# Patient Record
Sex: FEMALE | Race: BLACK OR AFRICAN AMERICAN | NOT HISPANIC OR LATINO | Employment: PART TIME | RURAL
[De-identification: names, ages, dates, MRNs, and addresses within clinical notes are randomized per-mention and may not be internally consistent; named-entity substitution may affect disease eponyms.]

---

## 2022-03-28 ENCOUNTER — OFFICE VISIT (OUTPATIENT)
Dept: OBSTETRICS AND GYNECOLOGY | Facility: CLINIC | Age: 42
End: 2022-03-28

## 2022-03-28 ENCOUNTER — LAB (OUTPATIENT)
Dept: LAB | Facility: HOSPITAL | Age: 42
End: 2022-03-28

## 2022-03-28 VITALS
SYSTOLIC BLOOD PRESSURE: 126 MMHG | HEIGHT: 63 IN | WEIGHT: 202 LBS | DIASTOLIC BLOOD PRESSURE: 80 MMHG | BODY MASS INDEX: 35.79 KG/M2

## 2022-03-28 DIAGNOSIS — Z12.31 ENCOUNTER FOR SCREENING MAMMOGRAM FOR BREAST CANCER: ICD-10-CM

## 2022-03-28 DIAGNOSIS — N92.0 MENORRHAGIA WITH REGULAR CYCLE: ICD-10-CM

## 2022-03-28 DIAGNOSIS — Z01.411 ENCOUNTER FOR GYNECOLOGICAL EXAMINATION WITH ABNORMAL FINDING: Primary | ICD-10-CM

## 2022-03-28 DIAGNOSIS — N85.2 ENLARGED UTERUS: ICD-10-CM

## 2022-03-28 PROCEDURE — 2014F MENTAL STATUS ASSESS: CPT | Performed by: NURSE PRACTITIONER

## 2022-03-28 PROCEDURE — 99386 PREV VISIT NEW AGE 40-64: CPT | Performed by: NURSE PRACTITIONER

## 2022-03-28 PROCEDURE — 3008F BODY MASS INDEX DOCD: CPT | Performed by: NURSE PRACTITIONER

## 2022-03-28 RX ORDER — CETIRIZINE HYDROCHLORIDE 10 MG/1
10 TABLET ORAL DAILY
COMMUNITY
Start: 2022-03-02

## 2022-03-28 RX ORDER — FLUTICASONE PROPIONATE 50 MCG
2 SPRAY, SUSPENSION (ML) NASAL DAILY PRN
COMMUNITY
Start: 2021-12-28

## 2022-03-28 RX ORDER — OMEPRAZOLE 40 MG/1
CAPSULE, DELAYED RELEASE ORAL
COMMUNITY
Start: 2022-01-28 | End: 2022-04-21

## 2022-03-28 RX ORDER — BUPROPION HYDROCHLORIDE 150 MG/1
150 TABLET ORAL EVERY MORNING
COMMUNITY
Start: 2022-01-28

## 2022-03-28 RX ORDER — LISINOPRIL AND HYDROCHLOROTHIAZIDE 12.5; 1 MG/1; MG/1
1 TABLET ORAL DAILY
COMMUNITY
Start: 2022-01-25

## 2022-04-05 LAB
LAB AP CASE REPORT: NORMAL
PATH INTERP SPEC-IMP: NORMAL

## 2022-04-06 DIAGNOSIS — N92.0 MENORRHAGIA WITH REGULAR CYCLE: ICD-10-CM

## 2022-04-06 DIAGNOSIS — N85.2 ENLARGED UTERUS: ICD-10-CM

## 2022-04-11 NOTE — PROGRESS NOTES
Subjective   Ashley Jones is a 42 y.o. here for annual exam.    LMP- 2/15; regular, no issues   Last pap- ?  Mammo- ?    Gynecologic Exam  The patient's primary symptoms include pelvic pain. The patient's pertinent negatives include no genital itching, genital lesions, genital odor, genital rash, missed menses, vaginal bleeding or vaginal discharge. This is a recurrent problem. The current episode started more than 1 month ago. The problem occurs intermittently. The problem has been waxing and waning. The problem affects both sides. Pertinent negatives include no abdominal pain, constipation, diarrhea or dysuria. She is sexually active. No, her partner does not have an STD. She uses tubal ligation for contraception. Her menstrual history has been regular.       The following portions of the patient's history were reviewed and updated as appropriate: allergies, current medications, past family history, past medical history, past social history, past surgical history and problem list.    Review of Systems   Constitutional: Negative for activity change, appetite change, diaphoresis, fatigue, unexpected weight gain and unexpected weight loss.   Respiratory: Negative for chest tightness and shortness of breath.    Cardiovascular: Negative for chest pain and palpitations.   Gastrointestinal: Negative for abdominal distention, abdominal pain, constipation and diarrhea.   Genitourinary: Positive for pelvic pain. Negative for amenorrhea, breast discharge, breast lump, breast pain, decreased libido, dyspareunia, dysuria, menstrual problem, missed menses, vaginal bleeding, vaginal discharge and vaginal pain.   Musculoskeletal: Negative for myalgias.   Skin: Negative for color change, dry skin and skin lesions.   Neurological: Negative for light-headedness and headache.   Psychiatric/Behavioral: Negative for agitation, dysphoric mood, sleep disturbance, depressed mood and stress. The patient is not nervous/anxious.         Objective   Physical Exam  Vitals and nursing note reviewed.   Constitutional:       General: She is awake. She is not in acute distress.     Appearance: Normal appearance. She is well-developed and well-groomed. She is not ill-appearing, toxic-appearing or diaphoretic.   Neck:      Thyroid: No thyroid mass, thyromegaly or thyroid tenderness.   Cardiovascular:      Rate and Rhythm: Normal rate and regular rhythm.      Heart sounds: Normal heart sounds.   Pulmonary:      Effort: Pulmonary effort is normal.      Breath sounds: Normal breath sounds.   Chest:   Breasts:      Vinicius Score is 5. Breasts are symmetrical.      Right: Normal. No swelling, bleeding, inverted nipple, mass, nipple discharge, skin change, tenderness, axillary adenopathy or supraclavicular adenopathy.      Left: Normal. No swelling, bleeding, inverted nipple, mass, nipple discharge, skin change, tenderness, axillary adenopathy or supraclavicular adenopathy.       Abdominal:      General: Bowel sounds are normal. There is no distension.      Palpations: Abdomen is soft.      Tenderness: There is no abdominal tenderness.   Genitourinary:     General: Normal vulva.      Exam position: Lithotomy position.      Vinicius stage (genital): 5.      Labia:         Right: No rash, tenderness, lesion or injury.         Left: No rash, tenderness, lesion or injury.       Urethra: No prolapse, urethral pain, urethral swelling or urethral lesion.      Vagina: Normal.      Cervix: Normal.      Uterus: Normal. Enlarged and tender.       Adnexa:         Right: Tenderness present. No mass or fullness.          Left: Tenderness present. No mass or fullness.        Comments: Pap obtained.   Lymphadenopathy:      Upper Body:      Right upper body: No supraclavicular, axillary or pectoral adenopathy.      Left upper body: No supraclavicular, axillary or pectoral adenopathy.      Lower Body: No right inguinal adenopathy. No left inguinal adenopathy.   Skin:      General: Skin is warm and dry.   Neurological:      Mental Status: She is alert and oriented to person, place, and time.      Gait: Gait is intact.   Psychiatric:         Attention and Perception: Attention and perception normal.         Mood and Affect: Mood and affect normal.         Speech: Speech normal.         Behavior: Behavior normal. Behavior is cooperative.           Assessment/Plan   Diagnoses and all orders for this visit:    1. Encounter for gynecological examination with abnormal finding (Primary)  -     Liquid-based Pap Smear, Screening    2. Menorrhagia with regular cycle  -     US Non-ob Transvaginal; Future    3. Enlarged uterus  -     US Non-ob Transvaginal; Future    4. Encounter for screening mammogram for breast cancer  -     Mammo Screening Digital Tomosynthesis Bilateral With CAD; Future          U/s with [facility]. Will call with results when available. Reviewed cervical and breast cancer screening recommendations. Needs mammo at [facility].

## 2022-04-13 ENCOUNTER — OFFICE VISIT (OUTPATIENT)
Dept: OBSTETRICS AND GYNECOLOGY | Facility: CLINIC | Age: 42
End: 2022-04-13

## 2022-04-13 DIAGNOSIS — N83.202 LEFT OVARIAN CYST: Primary | ICD-10-CM

## 2022-04-13 DIAGNOSIS — R10.2 PELVIC PAIN: ICD-10-CM

## 2022-04-13 DIAGNOSIS — N94.6 DYSMENORRHEA: ICD-10-CM

## 2022-04-13 DIAGNOSIS — Z98.51 POST-TUBAL LIGATION SYNDROME: ICD-10-CM

## 2022-04-13 DIAGNOSIS — N99.89 POST-TUBAL LIGATION SYNDROME: ICD-10-CM

## 2022-04-13 DIAGNOSIS — N93.9 ABNORMAL UTERINE BLEEDING (AUB): ICD-10-CM

## 2022-04-13 PROCEDURE — 99441 PR PHYS/QHP TELEPHONE EVALUATION 5-10 MIN: CPT | Performed by: OBSTETRICS & GYNECOLOGY

## 2022-04-13 NOTE — PROGRESS NOTES
Ashley Jones is a 42 y.o. y/o female.     Chief Complaint: Ovarian cyst    HPI:   42 y.o. .  No LMP recorded..  Patient was seen via telephone visit.  Lives in [place] and with poor weather today did not feel that she could make it up here.  Discussed with patient symptoms and findings at this time.  Patient had recent pelvic ultrasound which showed a 14 x 14 cm left ovarian mass.  This appeared to be a simple cyst.  Patient has been having lots of pelvic pressure and pain with significantly heavier periods and passing large clots.  Patient is interested in definitive management of the symptoms.  I feel that this ovary needs to be removed to get this cyst removed while this is likely simple cyst this could represent cancer although I feel that this is unlikely.  Will recommend a CA-125 today.  Given that the patient is having heavy bleeding with significant pelvic pain she would like to have a hysterectomy at the same time.  Has tried Mirena in the past but did not tolerate this well.  Also states that symptoms have gotten significantly worse since her tubal approximately 14 years ago.     Review of Systems   Constitutional: Negative for chills, fatigue and fever.   HENT: Negative for sore throat.    Eyes: Negative for visual disturbance.   Respiratory: Negative for cough, shortness of breath and wheezing.    Cardiovascular: Negative for chest pain, palpitations and leg swelling.   Gastrointestinal: Negative for abdominal pain, diarrhea, nausea and vomiting.   Endocrine: Negative for cold intolerance and heat intolerance.   Genitourinary: Positive for menstrual problem, pelvic pain and vaginal bleeding. Negative for dysuria, flank pain, frequency, vaginal discharge and vaginal pain.   Skin: Negative for color change and pallor.   Neurological: Negative for syncope, light-headedness and headaches.   Psychiatric/Behavioral: Negative for dysphoric mood and suicidal ideas. The patient is not  nervous/anxious.         The following portions of the patient's history were reviewed and updated as appropriate: allergies, current medications, past family history, past medical history, past social history, past surgical history and problem list.    Allergies   Allergen Reactions   • Amlodipine Besylate Unknown - Low Severity   • Sulfamethoxazole-Trimethoprim Unknown - Low Severity        Prior to Admission medications    Medication Sig Start Date End Date Taking? Authorizing Provider   buPROPion XL (WELLBUTRIN XL) 150 MG 24 hr tablet  22   Camilo Messina MD   cetirizine (zyrTEC) 10 MG tablet  3/2/22   Camilo Messina MD   fluticasone (FLONASE) 50 MCG/ACT nasal spray  21   Camilo Messina MD   lisinopril-hydrochlorothiazide (PRINZIDE,ZESTORETIC) 10-12.5 MG per tablet  22   Camilo Messina MD   omeprazole (priLOSEC) 40 MG capsule  22   Camilo Messina MD        The patient has a family history of   Family History   Problem Relation Age of Onset   • Hypertension Father    • Diabetes Father    • Hypertension Mother         History reviewed. No pertinent past medical history.     OB History        3    Para   2    Term   1       1    AB   1    Living   2       SAB        IAB        Ectopic        Molar        Multiple        Live Births   2                 Social History     Socioeconomic History   • Marital status: Significant Other   Tobacco Use   • Smoking status: Never Smoker   Substance and Sexual Activity   • Alcohol use: Never   • Drug use: Never        Past Surgical History:   Procedure Laterality Date   •  SECTION     • TUBAL ABDOMINAL LIGATION          There is no problem list on file for this patient.       There were no vitals filed for this visit.     There is no height or weight on file to calculate BMI.      Laboratory Data:   No results for input(s): GLUCOSE, BUN, CREATININE, NA, K, CL, CO2, CALCIUM, PROTEINTOT, ALBUMIN, ALT,  AST, ALKPHOS, BILITOT, EGFRIFNONA, GLOB, AGRATIO, BCR, ANIONGAP, BILIDIR, INDBILI in the last 92685 hours.  No results for input(s): WBC, RBC, HGB, HCT, MCV, MCH, MCHC, RDW, RDWSD, MPV, PLT in the last 28064 hours.  No results for input(s): HCGQUAL in the last 18525 hours.    Last Pap smear:   Last Completed Pap Smear          PAP SMEAR (Every 3 Years) Next due on 3/28/2025    03/28/2022  Liquid-based Pap Smear, Screening            Up-to-date    Assessment/Plan   Diagnoses and all orders for this visit:    1. Left ovarian cyst (Primary)    2. Pelvic pain    3. Abnormal uterine bleeding (AUB)    4. Dysmenorrhea    5. Post-tubal ligation syndrome      Patient with large left ovarian cyst which should be removed surgically.  Also with significant heavy periods and abdominal pain having failed Mirena in the past.  Patient now desiring definitive management of all of the symptoms.  We will plan for MARIANA/BSO sometime at next surgical availability.  Patient to return to see me in 1 week for endometrial biopsy will schedule surgery at that time.    This document has been electronically signed by Cruzito Tong DO on April 13, 2022 11:37 CDT     This visit has been rescheduled as a phone visit to comply with patient safety concerns in accordance with CDC recommendations. Total time of discussion was 10 minutes.

## 2022-04-19 ENCOUNTER — LAB (OUTPATIENT)
Dept: LAB | Facility: HOSPITAL | Age: 42
End: 2022-04-19

## 2022-04-19 DIAGNOSIS — N83.202 LEFT OVARIAN CYST: ICD-10-CM

## 2022-04-19 PROCEDURE — 86304 IMMUNOASSAY TUMOR CA 125: CPT

## 2022-04-20 LAB — CANCER AG125 SERPL QL: 19 U/ML (ref 0–38.1)

## 2022-04-21 ENCOUNTER — OFFICE VISIT (OUTPATIENT)
Dept: OBSTETRICS AND GYNECOLOGY | Facility: CLINIC | Age: 42
End: 2022-04-21

## 2022-04-21 VITALS
HEIGHT: 63 IN | BODY MASS INDEX: 35.26 KG/M2 | DIASTOLIC BLOOD PRESSURE: 94 MMHG | SYSTOLIC BLOOD PRESSURE: 142 MMHG | WEIGHT: 199 LBS

## 2022-04-21 DIAGNOSIS — N99.89 POST-TUBAL LIGATION SYNDROME: ICD-10-CM

## 2022-04-21 DIAGNOSIS — N92.0 MENORRHAGIA WITH REGULAR CYCLE: ICD-10-CM

## 2022-04-21 DIAGNOSIS — N83.201 RIGHT OVARIAN CYST: ICD-10-CM

## 2022-04-21 DIAGNOSIS — N83.202 LEFT OVARIAN CYST: ICD-10-CM

## 2022-04-21 DIAGNOSIS — N93.9 ABNORMAL UTERINE BLEEDING (AUB): Primary | ICD-10-CM

## 2022-04-21 DIAGNOSIS — Z98.51 POST-TUBAL LIGATION SYNDROME: ICD-10-CM

## 2022-04-21 DIAGNOSIS — N94.6 DYSMENORRHEA: ICD-10-CM

## 2022-04-21 DIAGNOSIS — R10.2 PELVIC PAIN: ICD-10-CM

## 2022-04-21 DIAGNOSIS — N85.2 ENLARGED UTERUS: ICD-10-CM

## 2022-04-21 PROCEDURE — 58100 BIOPSY OF UTERUS LINING: CPT | Performed by: OBSTETRICS & GYNECOLOGY

## 2022-04-21 PROCEDURE — 99214 OFFICE O/P EST MOD 30 MIN: CPT | Performed by: OBSTETRICS & GYNECOLOGY

## 2022-04-21 RX ORDER — SODIUM CHLORIDE, SODIUM LACTATE, POTASSIUM CHLORIDE, CALCIUM CHLORIDE 600; 310; 30; 20 MG/100ML; MG/100ML; MG/100ML; MG/100ML
125 INJECTION, SOLUTION INTRAVENOUS CONTINUOUS
Status: CANCELLED | OUTPATIENT
Start: 2022-05-24

## 2022-04-21 RX ORDER — SODIUM CHLORIDE 0.9 % (FLUSH) 0.9 %
3 SYRINGE (ML) INJECTION EVERY 12 HOURS SCHEDULED
Status: CANCELLED | OUTPATIENT
Start: 2022-05-24

## 2022-04-21 RX ORDER — SODIUM CHLORIDE 0.9 % (FLUSH) 0.9 %
10 SYRINGE (ML) INJECTION AS NEEDED
Status: CANCELLED | OUTPATIENT
Start: 2022-05-24

## 2022-04-21 RX ORDER — BUPIVACAINE HCL/0.9 % NACL/PF 0.1 %
2 PLASTIC BAG, INJECTION (ML) EPIDURAL ONCE
Status: CANCELLED | OUTPATIENT
Start: 2022-05-24 | End: 2022-04-21

## 2022-04-21 NOTE — PROGRESS NOTES
Ashley Jones is a 42 y.o. y/o female.     Chief Complaint: Large ovarian cysts, pelvic pain and heavy bleeding    HPI:   42 y.o. .  No LMP recorded..  Patient presents for follow-up on ovarian cysts and pelvic pain with bleeding today.  Patient continues to have pelvic pain and bleeding.  Unclear if these are secondary to cyst.  Patient has a large 14 cm cyst on her right ovary and a smaller 3 cm cyst on her left ovary.  Patient has longstanding pelvic problems, has tried hormonal therapy in the past but did not tolerate well and side effects did not do well for her.  Patient is now wanting definitive management of these symptoms via hysterectomy.  Patient has asked that since she is going to have to undergo major surgery to remove these ovarian cyst she would like uterus removed at the same time to alleviate the pelvic pain and cramping I felt that this was a reasonable request to avoid additional large surgeries in the future.  Patient also here for endometrial biopsy in preparation for hysterectomy.  Risks and benefits of this procedure explained.    Patient requests laparoscopic hysterectomy with bilateral salpingo-oophorectomy and cystoscopy. She understands the risk up to and including death. She understands the risk of serious urologic morbidity such as vesico vaginal fistula, damage to the ureter and or bladder with possible need for additional surgery and low possibility of nephrectomy and/or extended morbidity. She understands the risk of damage to bowel possible colostomy. She understands the risk of damage to bowel undetected at time of procedure with sepsis and/or need returned OR.  I discussed the risk of bleeding with the patient and possible risk of blood transfusion.  Blood products carry risk of HIV, infection, hepatitis, and transfusion reaction. Patient is willing to accept blood products. She understands the risk of delayed hemorrhage with possible need to return to the OR. She  understands the risk of hematoma formation. She understands the risk of infection in the abdominal wall, pelvis, abdomen and other parts of the body. She understands the alternatives of medical therapy and the risks and alternatives. Her questions are answered at length. She understands that there is a  possibility that we may need to convert this to a total abdominal hysterectomy and she accepts this. Patient expressed understanding and desires to proceed with surgery.    Note from last visit: Discussed with patient symptoms and findings at this time.  Patient had recent pelvic ultrasound which showed a 14 x 14 cm left ovarian mass.  This appeared to be a simple cyst.  Patient has been having lots of pelvic pressure and pain with significantly heavier periods and passing large clots.  Patient is interested in definitive management of the symptoms.  I feel that this ovary needs to be removed to get this cyst removed while this is likely simple cyst this could represent cancer although I feel that this is unlikely.  Will recommend a CA-125 today.  Given that the patient is having heavy bleeding with significant pelvic pain she would like to have a hysterectomy at the same time.  Has tried Mirena in the past but did not tolerate this well.  Also states that symptoms have gotten significantly worse since her tubal approximately 14 years ago     Review of Systems   Constitutional: Negative for chills, fatigue and fever.   HENT: Negative for sore throat.    Eyes: Negative for visual disturbance.   Respiratory: Negative for cough, shortness of breath and wheezing.    Cardiovascular: Negative for chest pain, palpitations and leg swelling.   Gastrointestinal: Negative for abdominal pain, diarrhea, nausea and vomiting.   Endocrine: Negative for cold intolerance and heat intolerance.   Genitourinary: Positive for menstrual problem, pelvic pain and vaginal bleeding. Negative for dysuria, flank pain, frequency, vaginal  discharge and vaginal pain.   Skin: Negative for color change and pallor.   Neurological: Negative for syncope, light-headedness and headaches.   Psychiatric/Behavioral: Negative for dysphoric mood and suicidal ideas. The patient is not nervous/anxious.         The following portions of the patient's history were reviewed and updated as appropriate: allergies, current medications, past family history, past medical history, past social history, past surgical history and problem list.    Allergies   Allergen Reactions   • Amlodipine Besylate Unknown - Low Severity   • Sulfamethoxazole-Trimethoprim Unknown - Low Severity        Prior to Admission medications    Medication Sig Start Date End Date Taking? Authorizing Provider   buPROPion XL (WELLBUTRIN XL) 150 MG 24 hr tablet  22  Yes ProviderCamilo MD   cetirizine (zyrTEC) 10 MG tablet  3/2/22  Yes ProviderCamilo MD   fluticasone (FLONASE) 50 MCG/ACT nasal spray  21  Yes ProviderCamilo MD   lisinopril-hydrochlorothiazide (PRINZIDE,ZESTORETIC) 10-12.5 MG per tablet  22  Yes ProviderCamilo MD   omeprazole (priLOSEC) 40 MG capsule  22  Provider, MD Camilo        The patient has a family history of   Family History   Problem Relation Age of Onset   • Hypertension Father    • Diabetes Father    • Hypertension Mother         History reviewed. No pertinent past medical history.     OB History        3    Para   2    Term   1       1    AB   1    Living   2       SAB        IAB        Ectopic        Molar        Multiple        Live Births   2                 Social History     Socioeconomic History   • Marital status: Significant Other   Tobacco Use   • Smoking status: Never Smoker   Substance and Sexual Activity   • Alcohol use: Never   • Drug use: Never        Past Surgical History:   Procedure Laterality Date   •  SECTION     • TUBAL ABDOMINAL LIGATION          Patient Active Problem List  "  Diagnosis   • Abnormal uterine bleeding (AUB)   • Pelvic pain   • Left ovarian cyst   • Dysmenorrhea   • Post-tubal ligation syndrome   • Menorrhagia with regular cycle   • Enlarged uterus   • Right ovarian cyst        Documented Vitals    04/21/22 1349   BP: 142/94   Weight: 90.3 kg (199 lb)   Height: 160 cm (63\")        Body mass index is 35.25 kg/m².    Physical Exam  Vitals and nursing note reviewed.   Constitutional:       General: She is not in acute distress.     Appearance: Normal appearance. She is well-developed. She is not ill-appearing, toxic-appearing or diaphoretic.   HENT:      Head: Normocephalic.      Mouth/Throat:      Mouth: Mucous membranes are moist.   Neck:      Thyroid: No thyromegaly.   Genitourinary:     General: Normal vulva.      Vagina: Normal.      Comments: Speculum exam performed.  Normal-appearing vagina and cervix.  Cervix was cleaned with Betadine.  Endometrial biopsy Pipelle introduced and sounded to approximately 9 cm.  Small amount of tissue obtained.  Normal pelvic exam large masses in both adnexa.  Musculoskeletal:         General: No swelling, tenderness or deformity. Normal range of motion.      Cervical back: Normal range of motion.   Skin:     General: Skin is warm and dry.      Findings: No erythema.   Neurological:      General: No focal deficit present.      Mental Status: She is alert and oriented to person, place, and time.   Psychiatric:         Mood and Affect: Mood normal.         Behavior: Behavior normal.         Thought Content: Thought content normal.         Judgment: Judgment normal.         Assessment/Plan   Diagnoses and all orders for this visit:    1. Abnormal uterine bleeding (AUB) (Primary)  -     Tissue Pathology Exam  -     Case Request; Standing  -     COVID PRE-OP / PRE-PROCEDURE SCREENING ORDER (NO ISOLATION) - Swab, Nasopharynx; Future  -     CBC and Differential; Future  -     Basic Metabolic Panel; Future  -     Case Request    2. Pelvic " pain  -     Case Request; Standing  -     COVID PRE-OP / PRE-PROCEDURE SCREENING ORDER (NO ISOLATION) - Swab, Nasopharynx; Future  -     CBC and Differential; Future  -     Basic Metabolic Panel; Future  -     Case Request    3. Left ovarian cyst  -     Case Request; Standing  -     COVID PRE-OP / PRE-PROCEDURE SCREENING ORDER (NO ISOLATION) - Swab, Nasopharynx; Future  -     CBC and Differential; Future  -     Basic Metabolic Panel; Future  -     Case Request    4. Dysmenorrhea  -     Case Request; Standing  -     COVID PRE-OP / PRE-PROCEDURE SCREENING ORDER (NO ISOLATION) - Swab, Nasopharynx; Future  -     CBC and Differential; Future  -     Basic Metabolic Panel; Future  -     Case Request    5. Post-tubal ligation syndrome  -     Case Request; Standing  -     COVID PRE-OP / PRE-PROCEDURE SCREENING ORDER (NO ISOLATION) - Swab, Nasopharynx; Future  -     CBC and Differential; Future  -     Basic Metabolic Panel; Future  -     Case Request    6. Menorrhagia with regular cycle  -     Case Request; Standing  -     COVID PRE-OP / PRE-PROCEDURE SCREENING ORDER (NO ISOLATION) - Swab, Nasopharynx; Future  -     CBC and Differential; Future  -     Basic Metabolic Panel; Future  -     Case Request    7. Enlarged uterus  -     Case Request; Standing  -     COVID PRE-OP / PRE-PROCEDURE SCREENING ORDER (NO ISOLATION) - Swab, Nasopharynx; Future  -     CBC and Differential; Future  -     Basic Metabolic Panel; Future  -     Case Request    8. Right ovarian cyst  -     Case Request; Standing  -     COVID PRE-OP / PRE-PROCEDURE SCREENING ORDER (NO ISOLATION) - Swab, Nasopharynx; Future  -     CBC and Differential; Future  -     Basic Metabolic Panel; Future  -     Case Request    Other orders  -     Follow Anesthesia Guidelines / Standing Orders; Future  -     Chlorhexidine Skin Prep; Future      Patient with large bilateral adnexal masses likely benign.  Normal .  Patient also with pelvic pain and heavy bleeding.  Patient  is desiring definitive management of all these things with 1 surgery rather than having multiple surgeries if pain does not improve.  We will plan for total laparoscopic hysterectomy with bilateral salpingo-oophorectomy on 24 May 2022    This document has been electronically signed by Cruzito Tong DO on April 21, 2022 16:52 CDT

## 2022-04-26 LAB
LAB AP CASE REPORT: NORMAL
LAB AP CLINICAL INFORMATION: NORMAL
PATH REPORT.FINAL DX SPEC: NORMAL

## 2022-05-23 ENCOUNTER — ANESTHESIA EVENT (OUTPATIENT)
Dept: PERIOP | Facility: HOSPITAL | Age: 42
End: 2022-05-23

## 2022-05-23 ENCOUNTER — PRE-ADMISSION TESTING (OUTPATIENT)
Dept: PREADMISSION TESTING | Facility: HOSPITAL | Age: 42
End: 2022-05-23

## 2022-05-23 ENCOUNTER — LAB (OUTPATIENT)
Dept: LAB | Facility: HOSPITAL | Age: 42
End: 2022-05-23

## 2022-05-23 VITALS
RESPIRATION RATE: 18 BRPM | WEIGHT: 204 LBS | OXYGEN SATURATION: 97 % | SYSTOLIC BLOOD PRESSURE: 118 MMHG | BODY MASS INDEX: 36.14 KG/M2 | HEIGHT: 63 IN | HEART RATE: 75 BPM | DIASTOLIC BLOOD PRESSURE: 80 MMHG

## 2022-05-23 DIAGNOSIS — R10.2 PELVIC PAIN: ICD-10-CM

## 2022-05-23 DIAGNOSIS — N83.201 RIGHT OVARIAN CYST: ICD-10-CM

## 2022-05-23 DIAGNOSIS — N93.9 ABNORMAL UTERINE BLEEDING (AUB): ICD-10-CM

## 2022-05-23 DIAGNOSIS — N92.0 MENORRHAGIA WITH REGULAR CYCLE: ICD-10-CM

## 2022-05-23 DIAGNOSIS — N85.2 ENLARGED UTERUS: ICD-10-CM

## 2022-05-23 DIAGNOSIS — N94.6 DYSMENORRHEA: ICD-10-CM

## 2022-05-23 DIAGNOSIS — N99.89 POST-TUBAL LIGATION SYNDROME: ICD-10-CM

## 2022-05-23 DIAGNOSIS — Z98.51 POST-TUBAL LIGATION SYNDROME: ICD-10-CM

## 2022-05-23 DIAGNOSIS — N83.202 LEFT OVARIAN CYST: ICD-10-CM

## 2022-05-23 LAB
ABO GROUP BLD: NORMAL
ANION GAP SERPL CALCULATED.3IONS-SCNC: 13 MMOL/L (ref 5–15)
BASOPHILS # BLD AUTO: 0.02 10*3/MM3 (ref 0–0.2)
BASOPHILS NFR BLD AUTO: 0.4 % (ref 0–1.5)
BLD GP AB SCN SERPL QL: NEGATIVE
BUN SERPL-MCNC: 8 MG/DL (ref 6–20)
BUN/CREAT SERPL: 9.5 (ref 7–25)
CALCIUM SPEC-SCNC: 9.5 MG/DL (ref 8.6–10.5)
CHLORIDE SERPL-SCNC: 101 MMOL/L (ref 98–107)
CO2 SERPL-SCNC: 25 MMOL/L (ref 22–29)
CREAT SERPL-MCNC: 0.84 MG/DL (ref 0.57–1)
DEPRECATED RDW RBC AUTO: 41.5 FL (ref 37–54)
EGFRCR SERPLBLD CKD-EPI 2021: 88.6 ML/MIN/1.73
EOSINOPHIL # BLD AUTO: 0.03 10*3/MM3 (ref 0–0.4)
EOSINOPHIL NFR BLD AUTO: 0.6 % (ref 0.3–6.2)
ERYTHROCYTE [DISTWIDTH] IN BLOOD BY AUTOMATED COUNT: 13.3 % (ref 12.3–15.4)
GLUCOSE SERPL-MCNC: 84 MG/DL (ref 65–99)
HCT VFR BLD AUTO: 37.1 % (ref 34–46.6)
HGB BLD-MCNC: 12.3 G/DL (ref 12–15.9)
IMM GRANULOCYTES # BLD AUTO: 0.02 10*3/MM3 (ref 0–0.05)
IMM GRANULOCYTES NFR BLD AUTO: 0.4 % (ref 0–0.5)
LYMPHOCYTES # BLD AUTO: 1.94 10*3/MM3 (ref 0.7–3.1)
LYMPHOCYTES NFR BLD AUTO: 39.4 % (ref 19.6–45.3)
Lab: NORMAL
MCH RBC QN AUTO: 28.5 PG (ref 26.6–33)
MCHC RBC AUTO-ENTMCNC: 33.2 G/DL (ref 31.5–35.7)
MCV RBC AUTO: 85.9 FL (ref 79–97)
MONOCYTES # BLD AUTO: 0.35 10*3/MM3 (ref 0.1–0.9)
MONOCYTES NFR BLD AUTO: 7.1 % (ref 5–12)
NEUTROPHILS NFR BLD AUTO: 2.57 10*3/MM3 (ref 1.7–7)
NEUTROPHILS NFR BLD AUTO: 52.1 % (ref 42.7–76)
NRBC BLD AUTO-RTO: 0 /100 WBC (ref 0–0.2)
PLATELET # BLD AUTO: 407 10*3/MM3 (ref 140–450)
PMV BLD AUTO: 9.7 FL (ref 6–12)
POTASSIUM SERPL-SCNC: 3.4 MMOL/L (ref 3.5–5.2)
RBC # BLD AUTO: 4.32 10*6/MM3 (ref 3.77–5.28)
RH BLD: POSITIVE
SARS-COV-2 N GENE RESP QL NAA+PROBE: NOT DETECTED
SODIUM SERPL-SCNC: 139 MMOL/L (ref 136–145)
T&S EXPIRATION DATE: NORMAL
WBC NRBC COR # BLD: 4.93 10*3/MM3 (ref 3.4–10.8)

## 2022-05-23 PROCEDURE — 93010 ELECTROCARDIOGRAM REPORT: CPT | Performed by: INTERNAL MEDICINE

## 2022-05-23 PROCEDURE — 93005 ELECTROCARDIOGRAM TRACING: CPT

## 2022-05-23 PROCEDURE — 36415 COLL VENOUS BLD VENIPUNCTURE: CPT

## 2022-05-23 PROCEDURE — 85025 COMPLETE CBC W/AUTO DIFF WBC: CPT

## 2022-05-23 PROCEDURE — 86850 RBC ANTIBODY SCREEN: CPT

## 2022-05-23 PROCEDURE — 80048 BASIC METABOLIC PNL TOTAL CA: CPT

## 2022-05-23 PROCEDURE — 86901 BLOOD TYPING SEROLOGIC RH(D): CPT

## 2022-05-23 PROCEDURE — 86900 BLOOD TYPING SEROLOGIC ABO: CPT

## 2022-05-23 PROCEDURE — C9803 HOPD COVID-19 SPEC COLLECT: HCPCS

## 2022-05-23 PROCEDURE — 87635 SARS-COV-2 COVID-19 AMP PRB: CPT

## 2022-05-23 RX ORDER — OMEPRAZOLE 40 MG/1
40 CAPSULE, DELAYED RELEASE ORAL DAILY
COMMUNITY

## 2022-05-23 NOTE — PAT
Chlorhexidine scrub given with instruction sheet. Instructions reviewed in PAT, understanding verbalized.    Dr. Raphael here to review EKG and speak with patient. No orders received, ok to proceed.

## 2022-05-23 NOTE — DISCHARGE INSTRUCTIONS
[facility]  Pre-op Information and Guidelines    You will be called after 2 p.m. the day before your surgery (Friday for Monday surgery) and notified of your time for arrival and approximate surgery time.  If you have not received a call by 4P.M., please contact Same Day Surgery at [phone] of if outside [facility] call [phone].    Please Follow these Important Safety Guidelines:    The morning of your procedure, take only the medications listed below with   A sip of water:_____________________________________________       ______________________________________________    DO NOT eat or drink anything after 12:00 midnight the night before surgery  Specific instructions concerning drinking clear liquids will be discussed during  the pre-surgery instruction call the day before your surgery.    If you take a blood thinner (ex. Plavix, Coumadin, aspirin), ask your doctor when to stop it before surgery  STOP DATE: _________________    Only 2 visitors are allowed in patient rooms at a time  Your visitors will be asked to wait in the lobby until the admission process is complete with the exception of a parent with a child and patients in need of special assistance.    YOU CANNOT DRIVE YOURSELF HOME  You must be accompanied by someone who will be responsible for driving you home after surgery and for your care at home.    DO NOT chew gum, use breath mints, hard candy, or smoke the day of surgery  DO NOT drink alcohol for at least 24 hours before your surgery  DO NOT wear any jewelry and remove all body piercing before coming to the hospital  DO NOT wear make-up to the hospital  If you are having surgery on an extremity (arm/leg/foot) remove nail polish/artificial nails on the surgical side  Clothing, glasses, contacts, dentures, and hairpieces must be removed before surgery  Bathe the night before or the morning of your surgery and do not use powders/lotions on skin.

## 2022-05-24 ENCOUNTER — ANESTHESIA (OUTPATIENT)
Dept: PERIOP | Facility: HOSPITAL | Age: 42
End: 2022-05-24

## 2022-05-24 ENCOUNTER — HOSPITAL ENCOUNTER (OUTPATIENT)
Facility: HOSPITAL | Age: 42
Setting detail: HOSPITAL OUTPATIENT SURGERY
Discharge: HOME | End: 2022-05-24
Attending: OBSTETRICS & GYNECOLOGY | Admitting: OBSTETRICS & GYNECOLOGY

## 2022-05-24 VITALS
SYSTOLIC BLOOD PRESSURE: 110 MMHG | OXYGEN SATURATION: 99 % | RESPIRATION RATE: 20 BRPM | WEIGHT: 200.18 LBS | DIASTOLIC BLOOD PRESSURE: 60 MMHG | HEIGHT: 63 IN | BODY MASS INDEX: 35.47 KG/M2 | TEMPERATURE: 97 F | HEART RATE: 86 BPM

## 2022-05-24 DIAGNOSIS — R10.2 PELVIC PAIN: ICD-10-CM

## 2022-05-24 DIAGNOSIS — N99.89 POST-TUBAL LIGATION SYNDROME: ICD-10-CM

## 2022-05-24 DIAGNOSIS — Z98.51 POST-TUBAL LIGATION SYNDROME: ICD-10-CM

## 2022-05-24 DIAGNOSIS — N93.9 ABNORMAL UTERINE BLEEDING (AUB): ICD-10-CM

## 2022-05-24 DIAGNOSIS — N94.6 DYSMENORRHEA: ICD-10-CM

## 2022-05-24 DIAGNOSIS — N83.202 LEFT OVARIAN CYST: ICD-10-CM

## 2022-05-24 DIAGNOSIS — Z90.710 STATUS POST LAPAROSCOPIC HYSTERECTOMY: ICD-10-CM

## 2022-05-24 DIAGNOSIS — Z90.722 STATUS POST BILATERAL SALPINGO-OOPHORECTOMY: Primary | ICD-10-CM

## 2022-05-24 DIAGNOSIS — N92.0 MENORRHAGIA WITH REGULAR CYCLE: ICD-10-CM

## 2022-05-24 DIAGNOSIS — N85.2 ENLARGED UTERUS: ICD-10-CM

## 2022-05-24 DIAGNOSIS — N83.201 RIGHT OVARIAN CYST: ICD-10-CM

## 2022-05-24 LAB
ABO GROUP BLD: NORMAL
BLD GP AB SCN SERPL QL: NEGATIVE
HCG SERPL QL: NEGATIVE
Lab: NORMAL
QT INTERVAL: 384 MS
QTC INTERVAL: 428 MS
RH BLD: POSITIVE
T&S EXPIRATION DATE: NORMAL

## 2022-05-24 PROCEDURE — 25010000002 ONDANSETRON PER 1 MG: Performed by: NURSE ANESTHETIST, CERTIFIED REGISTERED

## 2022-05-24 PROCEDURE — 25010000002 FENTANYL CITRATE (PF) 50 MCG/ML SOLUTION: Performed by: NURSE ANESTHETIST, CERTIFIED REGISTERED

## 2022-05-24 PROCEDURE — 25010000002 MIDAZOLAM PER 1 MG: Performed by: NURSE ANESTHETIST, CERTIFIED REGISTERED

## 2022-05-24 PROCEDURE — 58571 TLH W/T/O 250 G OR LESS: CPT | Performed by: OBSTETRICS & GYNECOLOGY

## 2022-05-24 PROCEDURE — 25010000002 HYDROMORPHONE 1 MG/ML SOLUTION: Performed by: NURSE ANESTHETIST, CERTIFIED REGISTERED

## 2022-05-24 PROCEDURE — 84703 CHORIONIC GONADOTROPIN ASSAY: CPT | Performed by: OBSTETRICS & GYNECOLOGY

## 2022-05-24 PROCEDURE — 25010000002 KETOROLAC TROMETHAMINE PER 15 MG: Performed by: NURSE ANESTHETIST, CERTIFIED REGISTERED

## 2022-05-24 PROCEDURE — 25010000002 CEFAZOLIN PER 500 MG: Performed by: OBSTETRICS & GYNECOLOGY

## 2022-05-24 PROCEDURE — 25010000002 DEXAMETHASONE PER 1 MG: Performed by: NURSE ANESTHETIST, CERTIFIED REGISTERED

## 2022-05-24 PROCEDURE — 25010000002 NEOSTIGMINE 10 MG/10ML SOLUTION: Performed by: NURSE ANESTHETIST, CERTIFIED REGISTERED

## 2022-05-24 PROCEDURE — S0260 H&P FOR SURGERY: HCPCS | Performed by: OBSTETRICS & GYNECOLOGY

## 2022-05-24 PROCEDURE — 86900 BLOOD TYPING SEROLOGIC ABO: CPT | Performed by: OBSTETRICS & GYNECOLOGY

## 2022-05-24 PROCEDURE — 86850 RBC ANTIBODY SCREEN: CPT | Performed by: OBSTETRICS & GYNECOLOGY

## 2022-05-24 PROCEDURE — 25010000002 PROPOFOL 10 MG/ML EMULSION: Performed by: NURSE ANESTHETIST, CERTIFIED REGISTERED

## 2022-05-24 PROCEDURE — 86901 BLOOD TYPING SEROLOGIC RH(D): CPT | Performed by: OBSTETRICS & GYNECOLOGY

## 2022-05-24 PROCEDURE — 58571 TLH W/T/O 250 G OR LESS: CPT | Performed by: SPECIALIST/TECHNOLOGIST, OTHER

## 2022-05-24 DEVICE — ABSORBABLE RELOAD
Type: IMPLANTABLE DEVICE | Site: ABDOMEN | Status: FUNCTIONAL
Brand: V-LOC 180

## 2022-05-24 DEVICE — HEMOST ABS SURGICEL PWDR 3GM: Type: IMPLANTABLE DEVICE | Site: ABDOMEN | Status: FUNCTIONAL

## 2022-05-24 RX ORDER — FLUMAZENIL 0.1 MG/ML
0.2 INJECTION INTRAVENOUS AS NEEDED
Status: DISCONTINUED | OUTPATIENT
Start: 2022-05-24 | End: 2022-05-24 | Stop reason: HOSPADM

## 2022-05-24 RX ORDER — MIDAZOLAM HYDROCHLORIDE 1 MG/ML
INJECTION INTRAMUSCULAR; INTRAVENOUS AS NEEDED
Status: DISCONTINUED | OUTPATIENT
Start: 2022-05-24 | End: 2022-05-24 | Stop reason: SURG

## 2022-05-24 RX ORDER — EPHEDRINE SULFATE 50 MG/ML
INJECTION, SOLUTION INTRAVENOUS AS NEEDED
Status: DISCONTINUED | OUTPATIENT
Start: 2022-05-24 | End: 2022-05-24 | Stop reason: SURG

## 2022-05-24 RX ORDER — MEPERIDINE HYDROCHLORIDE 25 MG/ML
12.5 INJECTION INTRAMUSCULAR; INTRAVENOUS; SUBCUTANEOUS
Status: DISCONTINUED | OUTPATIENT
Start: 2022-05-24 | End: 2022-05-24 | Stop reason: HOSPADM

## 2022-05-24 RX ORDER — PROPOFOL 10 MG/ML
VIAL (ML) INTRAVENOUS AS NEEDED
Status: DISCONTINUED | OUTPATIENT
Start: 2022-05-24 | End: 2022-05-24 | Stop reason: SURG

## 2022-05-24 RX ORDER — PROMETHAZINE HYDROCHLORIDE 25 MG/1
25 TABLET ORAL ONCE AS NEEDED
Status: DISCONTINUED | OUTPATIENT
Start: 2022-05-24 | End: 2022-05-24 | Stop reason: HOSPADM

## 2022-05-24 RX ORDER — ONDANSETRON 2 MG/ML
INJECTION INTRAMUSCULAR; INTRAVENOUS AS NEEDED
Status: DISCONTINUED | OUTPATIENT
Start: 2022-05-24 | End: 2022-05-24 | Stop reason: SURG

## 2022-05-24 RX ORDER — OXYCODONE AND ACETAMINOPHEN 7.5; 325 MG/1; MG/1
1 TABLET ORAL EVERY 4 HOURS PRN
Qty: 20 TABLET | Refills: 0 | Status: SHIPPED | OUTPATIENT
Start: 2022-05-24

## 2022-05-24 RX ORDER — BUPIVACAINE HYDROCHLORIDE 2.5 MG/ML
INJECTION, SOLUTION EPIDURAL; INFILTRATION; INTRACAUDAL AS NEEDED
Status: DISCONTINUED | OUTPATIENT
Start: 2022-05-24 | End: 2022-05-24 | Stop reason: HOSPADM

## 2022-05-24 RX ORDER — BUPIVACAINE HCL/0.9 % NACL/PF 0.1 %
2 PLASTIC BAG, INJECTION (ML) EPIDURAL ONCE
Status: COMPLETED | OUTPATIENT
Start: 2022-05-24 | End: 2022-05-24

## 2022-05-24 RX ORDER — NALOXONE HCL 0.4 MG/ML
0.4 VIAL (ML) INJECTION AS NEEDED
Status: DISCONTINUED | OUTPATIENT
Start: 2022-05-24 | End: 2022-05-24 | Stop reason: HOSPADM

## 2022-05-24 RX ORDER — ONDANSETRON 4 MG/1
4 TABLET, FILM COATED ORAL DAILY PRN
Qty: 30 TABLET | Refills: 1 | Status: SHIPPED | OUTPATIENT
Start: 2022-05-24 | End: 2023-05-24

## 2022-05-24 RX ORDER — VECURONIUM BROMIDE 1 MG/ML
INJECTION, POWDER, LYOPHILIZED, FOR SOLUTION INTRAVENOUS AS NEEDED
Status: DISCONTINUED | OUTPATIENT
Start: 2022-05-24 | End: 2022-05-24 | Stop reason: SURG

## 2022-05-24 RX ORDER — DIPHENHYDRAMINE HYDROCHLORIDE 50 MG/ML
12.5 INJECTION INTRAMUSCULAR; INTRAVENOUS
Status: DISCONTINUED | OUTPATIENT
Start: 2022-05-24 | End: 2022-05-24 | Stop reason: HOSPADM

## 2022-05-24 RX ORDER — PROMETHAZINE HYDROCHLORIDE 25 MG/1
25 SUPPOSITORY RECTAL ONCE AS NEEDED
Status: DISCONTINUED | OUTPATIENT
Start: 2022-05-24 | End: 2022-05-24 | Stop reason: HOSPADM

## 2022-05-24 RX ORDER — LIDOCAINE HYDROCHLORIDE 20 MG/ML
INJECTION, SOLUTION INFILTRATION; PERINEURAL AS NEEDED
Status: DISCONTINUED | OUTPATIENT
Start: 2022-05-24 | End: 2022-05-24 | Stop reason: SURG

## 2022-05-24 RX ORDER — SODIUM CHLORIDE 0.9 % (FLUSH) 0.9 %
10 SYRINGE (ML) INJECTION AS NEEDED
Status: DISCONTINUED | OUTPATIENT
Start: 2022-05-24 | End: 2022-05-24 | Stop reason: HOSPADM

## 2022-05-24 RX ORDER — OXYCODONE AND ACETAMINOPHEN 7.5; 325 MG/1; MG/1
1 TABLET ORAL ONCE AS NEEDED
Status: DISCONTINUED | OUTPATIENT
Start: 2022-05-24 | End: 2022-05-24 | Stop reason: HOSPADM

## 2022-05-24 RX ORDER — ACETAMINOPHEN 650 MG/1
650 SUPPOSITORY RECTAL ONCE AS NEEDED
Status: DISCONTINUED | OUTPATIENT
Start: 2022-05-24 | End: 2022-05-24 | Stop reason: HOSPADM

## 2022-05-24 RX ORDER — EPHEDRINE SULFATE 50 MG/ML
5 INJECTION, SOLUTION INTRAVENOUS ONCE AS NEEDED
Status: DISCONTINUED | OUTPATIENT
Start: 2022-05-24 | End: 2022-05-24 | Stop reason: HOSPADM

## 2022-05-24 RX ORDER — ONDANSETRON 2 MG/ML
4 INJECTION INTRAMUSCULAR; INTRAVENOUS ONCE AS NEEDED
Status: DISCONTINUED | OUTPATIENT
Start: 2022-05-24 | End: 2022-05-24 | Stop reason: HOSPADM

## 2022-05-24 RX ORDER — IBUPROFEN 600 MG/1
600 TABLET ORAL EVERY 6 HOURS PRN
Qty: 60 TABLET | Refills: 2 | Status: SHIPPED | OUTPATIENT
Start: 2022-05-24

## 2022-05-24 RX ORDER — SODIUM CHLORIDE, SODIUM GLUCONATE, SODIUM ACETATE, POTASSIUM CHLORIDE AND MAGNESIUM CHLORIDE 526; 502; 368; 37; 30 MG/100ML; MG/100ML; MG/100ML; MG/100ML; MG/100ML
INJECTION, SOLUTION INTRAVENOUS CONTINUOUS PRN
Status: DISCONTINUED | OUTPATIENT
Start: 2022-05-24 | End: 2022-05-24 | Stop reason: SURG

## 2022-05-24 RX ORDER — ACETAMINOPHEN 325 MG/1
650 TABLET ORAL ONCE AS NEEDED
Status: DISCONTINUED | OUTPATIENT
Start: 2022-05-24 | End: 2022-05-24 | Stop reason: HOSPADM

## 2022-05-24 RX ORDER — NEOSTIGMINE METHYLSULFATE 1 MG/ML
INJECTION, SOLUTION INTRAVENOUS AS NEEDED
Status: DISCONTINUED | OUTPATIENT
Start: 2022-05-24 | End: 2022-05-24 | Stop reason: SURG

## 2022-05-24 RX ORDER — KETOROLAC TROMETHAMINE 30 MG/ML
INJECTION, SOLUTION INTRAMUSCULAR; INTRAVENOUS AS NEEDED
Status: DISCONTINUED | OUTPATIENT
Start: 2022-05-24 | End: 2022-05-24 | Stop reason: SURG

## 2022-05-24 RX ORDER — FENTANYL CITRATE 50 UG/ML
INJECTION, SOLUTION INTRAMUSCULAR; INTRAVENOUS AS NEEDED
Status: DISCONTINUED | OUTPATIENT
Start: 2022-05-24 | End: 2022-05-24 | Stop reason: SURG

## 2022-05-24 RX ORDER — SODIUM CHLORIDE, SODIUM LACTATE, POTASSIUM CHLORIDE, CALCIUM CHLORIDE 600; 310; 30; 20 MG/100ML; MG/100ML; MG/100ML; MG/100ML
125 INJECTION, SOLUTION INTRAVENOUS CONTINUOUS
Status: DISCONTINUED | OUTPATIENT
Start: 2022-05-24 | End: 2022-05-24 | Stop reason: HOSPADM

## 2022-05-24 RX ORDER — DEXAMETHASONE SODIUM PHOSPHATE 4 MG/ML
INJECTION, SOLUTION INTRA-ARTICULAR; INTRALESIONAL; INTRAMUSCULAR; INTRAVENOUS; SOFT TISSUE AS NEEDED
Status: DISCONTINUED | OUTPATIENT
Start: 2022-05-24 | End: 2022-05-24 | Stop reason: SURG

## 2022-05-24 RX ORDER — POLYETHYLENE GLYCOL 3350 17 G/17G
17 POWDER, FOR SOLUTION ORAL 2 TIMES DAILY
Qty: 850 G | Refills: 3 | Status: SHIPPED | OUTPATIENT
Start: 2022-05-24

## 2022-05-24 RX ORDER — SODIUM CHLORIDE 0.9 % (FLUSH) 0.9 %
3 SYRINGE (ML) INJECTION EVERY 12 HOURS SCHEDULED
Status: DISCONTINUED | OUTPATIENT
Start: 2022-05-24 | End: 2022-05-24 | Stop reason: HOSPADM

## 2022-05-24 RX ADMIN — VECURONIUM BROMIDE 9 MG: 1 INJECTION, POWDER, LYOPHILIZED, FOR SOLUTION INTRAVENOUS at 07:20

## 2022-05-24 RX ADMIN — FENTANYL CITRATE 25 MCG: 50 INJECTION INTRAMUSCULAR; INTRAVENOUS at 09:22

## 2022-05-24 RX ADMIN — FENTANYL CITRATE 25 MCG: 50 INJECTION INTRAMUSCULAR; INTRAVENOUS at 09:23

## 2022-05-24 RX ADMIN — OXYCODONE HYDROCHLORIDE AND ACETAMINOPHEN 1 TABLET: 7.5; 325 TABLET ORAL at 10:51

## 2022-05-24 RX ADMIN — ONDANSETRON 8 MG: 2 INJECTION INTRAMUSCULAR; INTRAVENOUS at 09:13

## 2022-05-24 RX ADMIN — PROPOFOL 200 MG: 10 INJECTION, EMULSION INTRAVENOUS at 07:18

## 2022-05-24 RX ADMIN — LIDOCAINE HYDROCHLORIDE 100 MG: 20 INJECTION, SOLUTION INFILTRATION; PERINEURAL at 07:18

## 2022-05-24 RX ADMIN — FENTANYL CITRATE 50 MCG: 50 INJECTION INTRAMUSCULAR; INTRAVENOUS at 07:15

## 2022-05-24 RX ADMIN — Medication 2 G: at 07:26

## 2022-05-24 RX ADMIN — HYDROMORPHONE HYDROCHLORIDE 0.5 MG: 1 INJECTION, SOLUTION INTRAMUSCULAR; INTRAVENOUS; SUBCUTANEOUS at 09:46

## 2022-05-24 RX ADMIN — EPHEDRINE SULFATE 5 MG: 50 INJECTION INTRAVENOUS at 08:07

## 2022-05-24 RX ADMIN — MIDAZOLAM HYDROCHLORIDE 2 MG: 1 INJECTION, SOLUTION INTRAMUSCULAR; INTRAVENOUS at 07:12

## 2022-05-24 RX ADMIN — NEOSTIGMINE METHYLSULFATE 4 MG: 0.5 INJECTION INTRAVENOUS at 09:13

## 2022-05-24 RX ADMIN — HYDROMORPHONE HYDROCHLORIDE 0.5 MG: 1 INJECTION, SOLUTION INTRAMUSCULAR; INTRAVENOUS; SUBCUTANEOUS at 10:06

## 2022-05-24 RX ADMIN — HYDROMORPHONE HYDROCHLORIDE 0.5 MG: 1 INJECTION, SOLUTION INTRAMUSCULAR; INTRAVENOUS; SUBCUTANEOUS at 09:56

## 2022-05-24 RX ADMIN — DEXAMETHASONE SODIUM PHOSPHATE 8 MG: 4 INJECTION, SOLUTION INTRAMUSCULAR; INTRAVENOUS at 07:37

## 2022-05-24 RX ADMIN — FENTANYL CITRATE 50 MCG: 50 INJECTION INTRAMUSCULAR; INTRAVENOUS at 07:28

## 2022-05-24 RX ADMIN — SODIUM CHLORIDE, POTASSIUM CHLORIDE, SODIUM LACTATE AND CALCIUM CHLORIDE 125 ML/HR: 600; 310; 30; 20 INJECTION, SOLUTION INTRAVENOUS at 05:58

## 2022-05-24 RX ADMIN — FENTANYL CITRATE 50 MCG: 50 INJECTION INTRAMUSCULAR; INTRAVENOUS at 08:19

## 2022-05-24 RX ADMIN — GLYCOPYRROLATE 0.6 MG: 0.2 INJECTION, SOLUTION INTRAMUSCULAR; INTRAVITREAL at 09:13

## 2022-05-24 RX ADMIN — KETOROLAC TROMETHAMINE 30 MG: 30 INJECTION, SOLUTION INTRAMUSCULAR at 09:13

## 2022-05-24 RX ADMIN — GLYCOPYRROLATE 0.2 MG: 0.2 INJECTION, SOLUTION INTRAMUSCULAR; INTRAVITREAL at 08:06

## 2022-05-24 RX ADMIN — SODIUM CHLORIDE, SODIUM GLUCONATE, SODIUM ACETATE, POTASSIUM CHLORIDE AND MAGNESIUM CHLORIDE: 526; 502; 368; 37; 30 INJECTION, SOLUTION INTRAVENOUS at 08:07

## 2022-05-24 RX ADMIN — FLUORESCEIN SODIUM 25 MG: 100 INJECTION INTRAVENOUS at 08:53

## 2022-05-24 NOTE — OP NOTE
OPERATIVE NOTE  Ashley Jones  1980 5/24/2022    PREOP DIAGNOSES:  Abnormal uterine bleeding (AUB) [N93.9]  Pelvic pain [R10.2]  Left ovarian cyst [N83.202]  Dysmenorrhea [N94.6]  Post-tubal ligation syndrome [N99.89, Z98.51]  Menorrhagia with regular cycle [N92.0]  Enlarged uterus [N85.2]  Right ovarian cyst [N83.201]    POSTOP DIAGNOSES:  Post-Op Diagnosis Codes:     * Abnormal uterine bleeding (AUB) [N93.9]     * Pelvic pain [R10.2]     * Left ovarian cyst [N83.202]     * Dysmenorrhea [N94.6]     * Post-tubal ligation syndrome [N99.89, Z98.51]     * Menorrhagia with regular cycle [N92.0]     * Enlarged uterus [N85.2]     * Right ovarian cyst [N83.201]    Procedure(s):  TOTAL LAPAROSCOPIC HYSTERECTOMY BILATERAL SALPINGO OOPHORECTOMY, CYSTOSCOPY    SURGEON: Cruzito Tong DO, FACOG.  Shayan Grayson MD, FACOG    Assistant: Charline James CSA was responsible for performing the following activities: Retraction, Suction, Closing, Placing Dressing and Held/Positioned Camera and their skilled assistance was necessary for the success of this case.     STAFF:   Circulator: Amada Davis RN; Sandra Gamboa RN  Scrub Person: Leatha Allison  Assistant: Charline James CSA    ANESTHESIA: General    ANESTHESIA STAFF:  Anesthesiologist: Neil Raphael MD  CRNA: Sofi Rodriguez CRNA  Student Nurse Anesthetist: Yogesh Melton SRNA    ESTIMATED BLOOD LOSS: 150 ml     SPECIMEN:   ID Type Source Tests Collected by Time   A (Not marked as sent) : Fluid from right ovary cyst Body Fluid Ovary, Right NON-GYN CYTOLOGY, [facility] ([facility]) Cruzito Tong DO 5/24/2022 0820   B (Not marked as sent) :  Tissue Uterus with Cervix, Bilateral Tubes and Ovaries TISSUE EXAM, [facility] ([facility]) Cruzito Tong DO 5/24/2022 0922       FINDINGS: Normal-appearing uterus.  Large 15 to 20 cm right ovarian cyst.  Normal-appearing left ovary.  Normal-appearing vagina and cervix.   Normal-appearing bladder with reflux noted from bilateral ureteral orifices.    COMPLICATIONS: None    DESCRIPTION OF OPERATION: After obtaining informed consent the patient was taken to the operating room where general endotracheal anesthesia was found to be adequate she was then prepped and draped in the normal sterile fashion in the low lithotomy position.  A final timeout was performed and preoperative antibiotics were given.  Dr. Grayson was present through the entire surgery, he was present to help with retraction, visualization and expert opinion.  Dr. Grayson did the left side of the surgery.  Attention was turned to the vagina, a Vcare uterine manipulator was placed without difficulty.  Attention was then turned to the abdomen.  A 5 mm incision was made in the infraumbilical fold.  A 5 mm Optiview trocar was placed, once it was placed visualization appeared slightly abnormal with fluid around the camera.  On further evaluation trocar was noted to be inside large ovarian cyst.  At this time a second 5 mm incision was made at Carlson's point and a second 5 mm Optiview trocar was placed without difficulty.  Once this was placed it was confirmed that the other umbilical 5 mm trocar was within the ovarian cyst.  Suction irrigation was performed and fluid was sent for cytology.  Approximately 1400 cc of fluid was removed from the ovary.  Once the ovary was deflated visualization was easier.  Once this was done a second 5 mm trocar was placed in the right lower quadrant and a third in the left lower quadrant.  The umbilical trocar was removed and a an 11 mm trocar was placed.  Once this was done attention was turned to the right infundibulopelvic ligament.  This was cauterized and transected using the Enseal device to the level of the round ligament.  The right round ligament was then cauterized and transected using the Enseal device.  The right broad ligament was  into the anterior and posterior sheaths.  The  anterior sheath was taken down sharply using the Enseal device to the midline and the right side of the bladder flap was created.  The posterior sheath of the broad ligament was then taken down to the level of the right uterine artery.  The right uterine artery was then cauterized and transected using the Enseal device.  Once the right uterine artery was cauterized and transected the remainder of the adnexa on the right was cauterized and transected and allowed to fall out of the operative field using the Enseal device.  Attention was then turned to the left.  The left infundibulopelvic ligament was identified and cauterized and transected using the Enseal device to the level of the left round ligament.  The left round ligament was then cauterized and transected using the Enseal device.  The left broad ligament was  into the anterior and posterior sheaths.  The anterior sheath of the broad ligament was cauterized and transected using the Enseal device to the midline and the remainder of the bladder flap was created and the bladder was pushed out of the operative field.  The posterior sheath of the broad ligament was then cauterized and transected down to the level of the left uterine artery.  The left uterine artery was then cauterized and transected using the Enseal device.  The remainder of the left adnexa was cauterized and transected to the level of the Vcare cup using the Enseal device.  When this was complete a colpotomy was made in 360 degrees around the Vcare cup this was done using L-hook Bovie cautery.  The cervix was then amputated from the vagina.  The uterus cervix tubes and ovaries were then pulled into the vagina to maintain pneumoperitoneum.  The vaginal cuff was noted to have some mild bleeding at the right lateral angle this was cauterized using the Enseal device.  Once this was done the vaginal cuff was closed using 0 V-Loc suture.  Hemostasis was noted at the operative field.  The pelvis  was copiously irrigated.  Surgicel powder was placed over the operative field.  The 11 mm trocar was then removed and the fascia at the umbilicus was closed using 0 Vicryl and a Chris Connolly device.  The abdomen was then desufflated and the remaining trochars were removed without difficulty.  The skin was closed with 4-0 Monocryl and Dermabond.  Attention was then turned to the vagina.  A vaginal sweep was performed with nothing noted within the vagina and no bleeding was noted.  A cystoscopy was then performed with a E flux noted from the bilateral ureteral orifices no sutures were noted within the bladder and no bladder injuries were noted.  Patient tolerated the procedure well sponge lap and needle count were correct x2 the patient was taken to the recovery room in stable condition.          This document has been electronically signed by Cruzito Tong DO on May 24, 2022 09:27 CDT

## 2022-05-24 NOTE — H&P
Ashley Jones is a 42 y.o. y/o female.     Chief Complaint: pelvic pain and pelvic mass    HPI:   42 y.o. .  Patient's last menstrual period was 2022.. Reviewed Note from last visit and patient has no changes, continues to have same symptoms and wants to proceed with planned surgery.    Patient presents for follow-up on ovarian cysts and pelvic pain with bleeding today.  Patient continues to have pelvic pain and bleeding.  Unclear if these are secondary to cyst.  Patient has a large 14 cm cyst on her right ovary and a smaller 3 cm cyst on her left ovary.  Patient has longstanding pelvic problems, has tried hormonal therapy in the past but did not tolerate well and side effects did not do well for her.  Patient is now wanting definitive management of these symptoms via hysterectomy.  Patient has asked that since she is going to have to undergo major surgery to remove these ovarian cyst she would like uterus removed at the same time to alleviate the pelvic pain and cramping I felt that this was a reasonable request to avoid additional large surgeries in the future.  Patient also here for endometrial biopsy in preparation for hysterectomy.  Risks and benefits of this procedure explained.     Patient requests laparoscopic hysterectomy with bilateral salpingo-oophorectomy and cystoscopy. She understands the risk up to and including death. She understands the risk of serious urologic morbidity such as vesico vaginal fistula, damage to the ureter and or bladder with possible need for additional surgery and low possibility of nephrectomy and/or extended morbidity. She understands the risk of damage to bowel possible colostomy. She understands the risk of damage to bowel undetected at time of procedure with sepsis and/or need returned OR.  I discussed the risk of bleeding with the patient and possible risk of blood transfusion.  Blood products carry risk of HIV, infection, hepatitis, and transfusion  reaction. Patient is willing to accept blood products. She understands the risk of delayed hemorrhage with possible need to return to the OR. She understands the risk of hematoma formation. She understands the risk of infection in the abdominal wall, pelvis, abdomen and other parts of the body. She understands the alternatives of medical therapy and the risks and alternatives. Her questions are answered at length. She understands that there is a  possibility that we may need to convert this to a total abdominal hysterectomy and she accepts this. Patient expressed understanding and desires to proceed with surgery.     Note from last visit: Discussed with patient symptoms and findings at this time.  Patient had recent pelvic ultrasound which showed a 14 x 14 cm left ovarian mass.  This appeared to be a simple cyst.  Patient has been having lots of pelvic pressure and pain with significantly heavier periods and passing large clots.  Patient is interested in definitive management of the symptoms.  I feel that this ovary needs to be removed to get this cyst removed while this is likely simple cyst this could represent cancer although I feel that this is unlikely.  Will recommend a CA-125 today.  Given that the patient is having heavy bleeding with significant pelvic pain she would like to have a hysterectomy at the same time.  Has tried Mirena in the past but did not tolerate this well.  Also states that symptoms have gotten significantly worse since her tubal approximately 14 years ago     Review of Systems   Constitutional: Negative for chills, fatigue and fever.   HENT: Negative for sore throat.    Eyes: Negative for visual disturbance.   Respiratory: Negative for cough, shortness of breath and wheezing.    Cardiovascular: Negative for chest pain, palpitations and leg swelling.   Gastrointestinal: Negative for abdominal pain, diarrhea, nausea and vomiting.   Endocrine: Negative for cold intolerance and heat  intolerance.   Genitourinary: Positive for menstrual problem, pelvic pain and vaginal bleeding. Negative for dysuria, flank pain, frequency, vaginal discharge and vaginal pain.   Skin: Negative for color change and pallor.   Neurological: Negative for syncope, light-headedness and headaches.   Psychiatric/Behavioral: Negative for dysphoric mood and suicidal ideas. The patient is not nervous/anxious.         The following portions of the patient's history were reviewed and updated as appropriate: allergies, current medications, past family history, past medical history, past social history, past surgical history and problem list.    Allergies   Allergen Reactions   • Amlodipine Besylate Swelling   • Sulfamethoxazole-Trimethoprim Other (See Comments)     Yeast infection        Prior to Admission medications    Medication Sig Start Date End Date Taking? Authorizing Provider   buPROPion XL (WELLBUTRIN XL) 150 MG 24 hr tablet Take 150 mg by mouth Every Morning. 22  Yes Camilo Messina MD   cetirizine (zyrTEC) 10 MG tablet Take 10 mg by mouth Daily. 3/2/22  Yes Camilo Messina MD   fluticasone (FLONASE) 50 MCG/ACT nasal spray 2 sprays into the nostril(s) as directed by provider Daily As Needed. 21  Yes Camilo Messina MD   lisinopril-hydrochlorothiazide (PRINZIDE,ZESTORETIC) 10-12.5 MG per tablet Take 1 tablet by mouth Daily. 22  Yes Camilo Messina MD   omeprazole (priLOSEC) 40 MG capsule Take 40 mg by mouth Daily.   Yes ProviderCamilo MD        The patient has a family history of   Family History   Problem Relation Age of Onset   • Hypertension Father    • Diabetes Father    • Hypertension Mother         Past Medical History:   Diagnosis Date   • GERD (gastroesophageal reflux disease)    • Hypertension    • Migraines         OB History        3    Para   2    Term   1       1    AB   1    Living   2       SAB        IAB        Ectopic        Molar         "Multiple        Live Births   2                 Social History     Socioeconomic History   • Marital status: Single   Tobacco Use   • Smoking status: Never Smoker   • Smokeless tobacco: Never Used   Vaping Use   • Vaping Use: Never used   Substance and Sexual Activity   • Alcohol use: Yes     Comment: occasional   • Drug use: Never   • Sexual activity: Defer        Past Surgical History:   Procedure Laterality Date   •  SECTION     • TUBAL ABDOMINAL LIGATION          Patient Active Problem List   Diagnosis   • Abnormal uterine bleeding (AUB)   • Pelvic pain   • Left ovarian cyst   • Dysmenorrhea   • Post-tubal ligation syndrome   • Menorrhagia with regular cycle   • Enlarged uterus   • Right ovarian cyst        Documented Vitals    22 0551   BP: 145/87   Pulse: 93   Resp: 18   Temp: 97.3 °F (36.3 °C)   SpO2: 98%   Weight: 90.8 kg (200 lb 2.8 oz)   Height: 160 cm (63\")        Body mass index is 35.46 kg/m².    Physical Exam  Vitals and nursing note reviewed.   Constitutional:       General: She is not in acute distress.     Appearance: Normal appearance. She is well-developed. She is not ill-appearing, toxic-appearing or diaphoretic.   HENT:      Head: Normocephalic.      Mouth/Throat:      Mouth: Mucous membranes are moist.   Neck:      Thyroid: No thyromegaly.   Abdominal:      General: There is no distension.      Palpations: Abdomen is soft.      Tenderness: There is no abdominal tenderness. There is no guarding.   Genitourinary:     Vagina: Normal.   Musculoskeletal:         General: No swelling, tenderness or deformity. Normal range of motion.      Cervical back: Normal range of motion.   Skin:     General: Skin is warm and dry.      Findings: No erythema.   Neurological:      General: No focal deficit present.      Mental Status: She is alert and oriented to person, place, and time.   Psychiatric:         Mood and Affect: Mood normal.         Behavior: Behavior normal.         Thought Content: " Thought content normal.         Judgment: Judgment normal.         Laboratory Data:   Component   Ref Range & Units 1 d ago    WBC   3.40 - 10.80 10*3/mm3 4.93    RBC   3.77 - 5.28 10*6/mm3 4.32    Hemoglobin   12.0 - 15.9 g/dL 12.3    Hematocrit   34.0 - 46.6 % 37.1    MCV   79.0 - 97.0 fL 85.9    MCH   26.6 - 33.0 pg 28.5    MCHC   31.5 - 35.7 g/dL 33.2    RDW   12.3 - 15.4 % 13.3    RDW-SD   37.0 - 54.0 fl 41.5    MPV   6.0 - 12.0 fL 9.7    Platelets   140 - 450 10*3/mm3 407    Neutrophil %   42.7 - 76.0 % 52.1    Lymphocyte %   19.6 - 45.3 % 39.4    Monocyte %   5.0 - 12.0 % 7.1    Eosinophil %   0.3 - 6.2 % 0.6    Basophil %   0.0 - 1.5 % 0.4    Immature Grans %   0.0 - 0.5 % 0.4    Neutrophils, Absolute   1.70 - 7.00 10*3/mm3 2.57    Lymphocytes, Absolute   0.70 - 3.10 10*3/mm3 1.94    Monocytes, Absolute   0.10 - 0.90 10*3/mm3 0.35    Eosinophils, Absolute   0.00 - 0.40 10*3/mm3 0.03    Basophils, Absolute   0.00 - 0.20 10*3/mm3 0.02    Immature Grans, Absolute   0.00 - 0.05 10*3/mm3 0.02    nRBC   0.0 - 0.2 /100 WBC 0.0      Ultrasound with large left ovarian cyst    EMBx normal    Last Pap smear:   Last Completed Pap Smear          PAP SMEAR (Every 3 Years) Next due on 3/28/2025    03/28/2022  Liquid-based Pap Smear, Screening            up to date      Assessment & Plan   Diagnoses and all orders for this visit:    1. Dysmenorrhea  -     sodium chloride 0.9 % flush 3 mL  -     sodium chloride 0.9 % flush 10 mL  -     lactated ringers infusion  -     ceFAZolin in 0.9% normal saline (ANCEF) IVPB solution 2 g    2. Enlarged uterus  -     sodium chloride 0.9 % flush 3 mL  -     sodium chloride 0.9 % flush 10 mL  -     lactated ringers infusion  -     ceFAZolin in 0.9% normal saline (ANCEF) IVPB solution 2 g    3. Left ovarian cyst  -     sodium chloride 0.9 % flush 3 mL  -     sodium chloride 0.9 % flush 10 mL  -     lactated ringers infusion  -     ceFAZolin in 0.9% normal saline (ANCEF) IVPB solution 2  g    4. Right ovarian cyst  -     sodium chloride 0.9 % flush 3 mL  -     sodium chloride 0.9 % flush 10 mL  -     lactated ringers infusion  -     ceFAZolin in 0.9% normal saline (ANCEF) IVPB solution 2 g    5. Menorrhagia with regular cycle  -     sodium chloride 0.9 % flush 3 mL  -     sodium chloride 0.9 % flush 10 mL  -     lactated ringers infusion  -     ceFAZolin in 0.9% normal saline (ANCEF) IVPB solution 2 g    6. Pelvic pain  -     sodium chloride 0.9 % flush 3 mL  -     sodium chloride 0.9 % flush 10 mL  -     lactated ringers infusion  -     ceFAZolin in 0.9% normal saline (ANCEF) IVPB solution 2 g    7. Abnormal uterine bleeding (AUB)  -     sodium chloride 0.9 % flush 3 mL  -     sodium chloride 0.9 % flush 10 mL  -     lactated ringers infusion  -     ceFAZolin in 0.9% normal saline (ANCEF) IVPB solution 2 g    8. Post-tubal ligation syndrome  -     sodium chloride 0.9 % flush 3 mL  -     sodium chloride 0.9 % flush 10 mL  -     lactated ringers infusion  -     ceFAZolin in 0.9% normal saline (ANCEF) IVPB solution 2 g    Other orders  -     Follow Anesthesia Guidelines / Standing Orders; Standing  -     Obtain Informed Consent; Standing  -     Place Sequential Compression Device; Standing  -     Verify / Perform Chlorhexidine Skin Prep; Standing  -     Pregnancy, Urine -; Standing  -     Type & Screen; Standing  -     Insert Peripheral IV; Standing  -     Saline Lock & Maintain IV Access; Standing  -     Initiate Anesthesia Protocol; Standing  -     Follow Anesthesia Guidelines / Standing Orders  -     Obtain Informed Consent  -     Place Sequential Compression Device  -     Pregnancy, Urine -  -     Type & Screen  -     Insert Peripheral IV  -     Saline Lock & Maintain IV Access  -     Initiate Anesthesia Protocol  -     hCG, Serum, Qualitative; Standing  -     hCG, Serum, Qualitative  -     PREVIOUS HISTORY; Standing  -     PREVIOUS HISTORY      Patient with pelvic and large left adnexal mass  likely simple ovarian cyst. Plan for TLH with BSO today. Patient to follow up on 16 June in [place].    This document has been electronically signed by Cruzito Tong DO on May 24, 2022 07:03 CDT

## 2022-05-24 NOTE — ANESTHESIA PREPROCEDURE EVALUATION
Anesthesia Evaluation     no history of anesthetic complications:  NPO Solid Status: > 8 hours  NPO Liquid Status: > 2 hours           Airway   Mallampati: I  TM distance: >3 FB  Neck ROM: full  No difficulty expected  Dental - normal exam     Pulmonary - normal exam    breath sounds clear to auscultation  (-) COPD, asthma, sleep apnea, not a smoker    ROS comment: snores  Cardiovascular - normal exam  Exercise tolerance: good (4-7 METS)    ECG reviewed  Rhythm: regular    (+) hypertension well controlled less than 2 medications,   (-) valvular problems/murmurs, dysrhythmias, angina, cardiac stents, DVT, hyperlipidemia    ROS comment:   Normal sinus rhythm with sinus arrhythmia  Nonspecific T wave abnormality  Abnormal ECG  No previous ECGs available    Neuro/Psych  (+) headaches (migraines occ), psychiatric history Anxiety and Depression,    (-) seizures, TIA, CVA, weakness, numbness  GI/Hepatic/Renal/Endo    (+) obesity,  GERD well controlled,    (-) hepatitis, liver disease, no renal disease, diabetes, no thyroid disorder    Musculoskeletal (-) negative ROS    Abdominal   (+) obese,    Substance History   (+) alcohol use (occ),   (-) drug use     OB/GYN    (-)  Pregnant    Comment: S/p tubal ligation      Other        (-) history of cancer  ROS/Med Hx Other: AUB                  Anesthesia Plan    ASA 3     general     intravenous induction     Anesthetic plan, all risks, benefits, and alternatives have been provided, discussed and informed consent has been obtained with: patient and mother.  Use of blood products discussed with patient  Consented to blood products.       CODE STATUS:

## 2022-05-24 NOTE — ANESTHESIA POSTPROCEDURE EVALUATION
Patient: Ashley Jones    Procedure Summary     Date: 05/24/22 Room / Location: [facility] OR [address] [place] [address] OR    Anesthesia Start: 0712 Anesthesia Stop:     Procedure: TOTAL LAPAROSCOPIC HYSTERECTOMY BILATERAL SALPINGO OOPHORECTOMY, CYSTOSCOPY (Bilateral Abdomen) Diagnosis:       Abnormal uterine bleeding (AUB)      Pelvic pain      Left ovarian cyst      Dysmenorrhea      Post-tubal ligation syndrome      Menorrhagia with regular cycle      Enlarged uterus      Right ovarian cyst      (Abnormal uterine bleeding (AUB) [N93.9])      (Pelvic pain [R10.2])      (Left ovarian cyst [N83.202])      (Dysmenorrhea [N94.6])      (Post-tubal ligation syndrome [N99.89, Z98.51])      (Menorrhagia with regular cycle [N92.0])      (Enlarged uterus [N85.2])      (Right ovarian cyst [N83.201])    Surgeons: Cruzito Tong DO Provider: Neil Raphael MD    Anesthesia Type: general ASA Status: 3          Anesthesia Type: general    Vitals  Vitals Value Taken Time   /74 05/24/22 0835   Temp 97.3 °F (36.3 °C) 05/24/22 0835   Pulse 88 05/24/22 0835   Resp 16 05/24/22 0835   SpO2 93 % 05/24/22 0835           Post Anesthesia Care and Evaluation    Patient location during evaluation: PACU  Patient participation: complete - patient cannot participate  Level of consciousness: sleepy but conscious  Pain score: 0  Pain management: adequate  Airway patency: patent  Anesthetic complications: No anesthetic complications  PONV Status: none  Cardiovascular status: acceptable and hemodynamically stable  Respiratory status: acceptable, spontaneous ventilation and room air  Hydration status: acceptable    Comments: /57, HR 83, sat 94%, RR 14, HOB elevated and exchanging well

## 2022-05-24 NOTE — ANESTHESIA PROCEDURE NOTES
Airway  Urgency: elective    Date/Time: 5/24/2022 7:23 AM  Airway not difficult    General Information and Staff    Patient location during procedure: OR  CRNA/CAA: Sofi Rodriguez CRNA  SRNA: Yogesh Melton SRNA  Indications and Patient Condition  Indications for airway management: airway protection    Preoxygenated: yes  MILS not maintained throughout  Mask difficulty assessment: 2 - vent by mask + OA or adjuvant +/- NMBA    Final Airway Details  Final airway type: endotracheal airway      Successful airway: ETT  Cuffed: yes   Successful intubation technique: direct laryngoscopy  Facilitating devices/methods: intubating stylet  Endotracheal tube insertion site: oral  Blade: Marcello  Blade size: 3  ETT size (mm): 7.5  Cormack-Lehane Classification: grade I - full view of glottis  Placement verified by: chest auscultation and capnometry   Measured from: lips  Number of attempts at approach: 1  Assessment: lips, teeth, and gum same as pre-op and atraumatic intubation

## 2022-05-26 LAB
REF LAB TEST METHOD: NORMAL
REF LAB TEST METHOD: NORMAL

## 2022-06-16 ENCOUNTER — OFFICE VISIT (OUTPATIENT)
Dept: OBSTETRICS AND GYNECOLOGY | Facility: CLINIC | Age: 42
End: 2022-06-16

## 2022-06-16 VITALS
SYSTOLIC BLOOD PRESSURE: 112 MMHG | WEIGHT: 197 LBS | DIASTOLIC BLOOD PRESSURE: 80 MMHG | HEIGHT: 63 IN | BODY MASS INDEX: 34.91 KG/M2

## 2022-06-16 DIAGNOSIS — Z90.710 STATUS POST LAPAROSCOPIC HYSTERECTOMY: ICD-10-CM

## 2022-06-16 DIAGNOSIS — Z90.722 STATUS POST BILATERAL SALPINGO-OOPHORECTOMY: Primary | ICD-10-CM

## 2022-06-16 DIAGNOSIS — Z09 POSTOPERATIVE FOLLOW-UP: ICD-10-CM

## 2022-06-16 PROCEDURE — 99024 POSTOP FOLLOW-UP VISIT: CPT | Performed by: OBSTETRICS & GYNECOLOGY

## 2022-06-16 RX ORDER — ESTRADIOL 1 MG/1
1 TABLET ORAL DAILY
Qty: 90 TABLET | Refills: 3 | Status: SHIPPED | OUTPATIENT
Start: 2022-06-16

## 2022-06-16 NOTE — PROGRESS NOTES
Chief complaint: Postoperative follow-up    Patient presents for postop follow-up status post TLH BSO on 5/24/2022.  Patient is overall doing well and without major complaints at this time.  She is ambulating without difficulty, tolerating a regular diet.  She is urinating without difficulty.  Pain is well controlled with pain medications at this time.  No questions or concerns at this time.  Patient is having significant hot flashes.  We will start her on Estrace 1 mg.  Does have a significant family history of breast cancer and is somewhat nervous about starting on estrogen, I explained to her risks and benefits and she would like to start me only do this for short time.  We will reevaluate in 3 weeks if hot flashes still ongoing or worsening will consider increasing to 2 mg if doing well will continue at 1 mg.    Vital signs reviewed  No acute distress  Awake and oriented x3  Abdomen soft appropriately tender, well-healed trocar site incisions    Patient is status post TLH BSO doing well and recovering appropriately at this time.  Patient is meeting appropriate milestones at this time.  Patient to return in 3 weeks.  Patient started on Estrace daily also given short course of Premarin due to some vaginal spotting.

## 2022-07-06 ENCOUNTER — OFFICE VISIT (OUTPATIENT)
Dept: OBSTETRICS AND GYNECOLOGY | Facility: CLINIC | Age: 42
End: 2022-07-06

## 2022-07-06 VITALS
DIASTOLIC BLOOD PRESSURE: 80 MMHG | BODY MASS INDEX: 34.91 KG/M2 | WEIGHT: 197 LBS | HEIGHT: 63 IN | SYSTOLIC BLOOD PRESSURE: 130 MMHG

## 2022-07-06 DIAGNOSIS — N95.1 VASOMOTOR SYMPTOMS DUE TO MENOPAUSE: ICD-10-CM

## 2022-07-06 DIAGNOSIS — Z90.710 STATUS POST LAPAROSCOPIC HYSTERECTOMY: ICD-10-CM

## 2022-07-06 DIAGNOSIS — Z79.890 HORMONE REPLACEMENT THERAPY (HRT): ICD-10-CM

## 2022-07-06 DIAGNOSIS — Z90.722 STATUS POST BILATERAL SALPINGO-OOPHORECTOMY: Primary | ICD-10-CM

## 2022-07-06 PROCEDURE — 99024 POSTOP FOLLOW-UP VISIT: CPT | Performed by: NURSE PRACTITIONER

## 2022-07-06 RX ORDER — PROGESTERONE 100 MG/1
100 CAPSULE ORAL DAILY
Qty: 30 CAPSULE | Refills: 12 | Status: SHIPPED | OUTPATIENT
Start: 2022-07-06

## 2022-07-08 PROBLEM — N95.1 VASOMOTOR SYMPTOMS DUE TO MENOPAUSE: Status: ACTIVE | Noted: 2022-07-08

## 2022-07-08 PROBLEM — Z79.890 HORMONE REPLACEMENT THERAPY (HRT): Status: ACTIVE | Noted: 2022-07-08

## 2022-07-08 NOTE — PROGRESS NOTES
"[facility]  Gynecology  Date of Service: 2022    CC: Post-operative Visit    DOS: 21  Pre-op diagnosis: Abnormal uterine bleeding (AUB) [N93.9]  Pelvic pain [R10.2]  Left ovarian cyst [N83.202]  Dysmenorrhea [N94.6]  Post-tubal ligation syndrome [N99.89, Z98.51]  Menorrhagia with regular cycle [N92.0]  Enlarged uterus [N85.2]  Right ovarian cyst [N83.201]    Procedure: TOTAL LAPAROSCOPIC HYSTERECTOMY BILATERAL SALPINGO OOPHORECTOMY, CYSTOSCOPY    Pathology: UTERUS, FALLOPIAN TUBES AND OVARIES:   Benign cervix with mild chronic cervicitis   Benign endometrial glands and stroma   Adenomyosis   Benign leiomyomata (up to 6 mm in greatest measure dimension)   Bilateral ovaries with benign physiologic cysts   Benign bilateral fimbriated fallopian tubes with benign peritubal cysts   Negative for dysplasia or malignancy     Patient presents for post-op visit.  She states that she is doing very well and has returned to all normal activity excluding intercourse. She has no pain or issues with bowels, bladder or eating and drinking. She notes that she stopped the estradiol last week because she was having itching all over her body. Since she stopped them, the itching has subsided. She is having unmanageable hot flashes at this time, but mainly at nighttime.     /80   Ht 160 cm (63\")   Wt 89.4 kg (197 lb)   LMP 2022   Breastfeeding No   BMI 34.90 kg/m²   Gen: NAD, AAO x3  Abd: Soft, NTND, incisions c/d/i.  : External genitalia WNL. Vaginal cuff visually intact, palpates intact.    A/P: Ashley Jones is a 42 y.o.  s/p MARIANA/BSO on 22.  Doing well.  - Reviewed pathology  - May return to all normal activities.  - Start on Prometrium 100mg HS to help manage night sweats and hot flashes. RBA and potential s/e reviewed. Pt agrees to plan and v/u.        This document has been electronically signed by J CARLOS Chavez on 2022 09:14 CDT      "

## (undated) DEVICE — SOL IRRIG NACL 1000ML

## (undated) DEVICE — SUT VIC 0/0 UR6 27IN DYED J603H

## (undated) DEVICE — SUT MERSILENE 5MM ETHD9212

## (undated) DEVICE — ADHS SKIN MASTISOL CAP 2OZ DISP

## (undated) DEVICE — METER,URINE,400ML,DRAIN BAG,L/F,LL,SLIDE: Brand: MEDLINE

## (undated) DEVICE — LAPAROSCOPIC SMOKE FILTRATION SYSTEM: Brand: PALL LAPAROSHIELD® PLUS LAPAROSCOPIC SMOKE FILTRATION SYSTEM

## (undated) DEVICE — VISUALIZATION SYSTEM: Brand: CLEARIFY

## (undated) DEVICE — VCARE LARGE UTERINE MANIPULATOR: Brand: VCARE LARGE

## (undated) DEVICE — PK LAP GYN 60

## (undated) DEVICE — TRENDELENBURG POSITIONING KIT DELUXE - EXTENDED LENGTH WITH BODY STRAP: Brand: SOULE MEDICAL

## (undated) DEVICE — MONOPOLAR METZENBAUM SCISSOR TIP, DISPOSABLE: Brand: MONOPOLAR METZENBAUM SCISSOR TIP, DISPOSABLE

## (undated) DEVICE — ADHS SKIN PREMIERPRO EXOFIN TOPICAL HI/VISC .5ML

## (undated) DEVICE — CYSTO/BLADDER IRRIGATION SET, REGULATING CLAMP

## (undated) DEVICE — GLV SURG NEOLON 2G PF LF 6.5 STRL

## (undated) DEVICE — PATIENT RETURN ELECTRODE, SINGLE-USE, CONTACT QUALITY MONITORING, ADULT, WITH 9FT CORD, FOR PATIENTS WEIGING OVER 33LBS. (15KG): Brand: MEGADYNE

## (undated) DEVICE — 3M™ STERI-STRIP™ REINFORCED ADHESIVE SKIN CLOSURES, R1547, 1/2 IN X 4 IN (12 MM X 100 MM), 6 STRIPS/ENVELOPE: Brand: 3M™ STERI-STRIP™

## (undated) DEVICE — CATH FOL LUBRISIL 3WY 18F 5CC

## (undated) DEVICE — SUTURING DEVICE: Brand: ENDO STITCH

## (undated) DEVICE — ENDOPATH XCEL BLADELESS TROCARS WITH STABILITY SLEEVES: Brand: ENDOPATH XCEL

## (undated) DEVICE — SOL IRR NACL 0.9PCT BT 1000ML

## (undated) DEVICE — ELECTRD E/S MEGADYNE EZCLEAN L/WR LAP 5MM 33CM 1P/U

## (undated) DEVICE — APPL HEMO ENDO SURGICEL 2IN1 1P/U STRL

## (undated) DEVICE — DRAPE UNDERBUTTOCKS W FLUID POUCH 40X44IN

## (undated) DEVICE — SYR LUERLOK 50ML

## (undated) DEVICE — SYS CLS PORTSITE CT CLOSESURE 5AND10/12

## (undated) DEVICE — GOWN,PREVENTION PLUS,XLNG/XXLARGE,STRL: Brand: MEDLINE

## (undated) DEVICE — STERILE POLYISOPRENE POWDER-FREE SURGICAL GLOVES WITH EMOLLIENT COATING: Brand: PROTEXIS

## (undated) DEVICE — ENSEAL X1 TISSUE SEALER, CURVED JAW, 37 CM SHAFT LENGTH: Brand: ENSEAL

## (undated) DEVICE — SUT MNCRYL 3/0 Y936H

## (undated) DEVICE — GLV SURG SIGNATURE ESSENTIAL PF LTX SZ7.5

## (undated) DEVICE — TBG INSUFFLATION W/PUSH ON CON: Brand: MEDLINE INDUSTRIES, INC.